# Patient Record
Sex: MALE | URBAN - NONMETROPOLITAN AREA
[De-identification: names, ages, dates, MRNs, and addresses within clinical notes are randomized per-mention and may not be internally consistent; named-entity substitution may affect disease eponyms.]

---

## 2023-05-11 ENCOUNTER — APPOINTMENT (RX ONLY)
Dept: URBAN - NONMETROPOLITAN AREA CLINIC 15 | Facility: CLINIC | Age: 53
Setting detail: DERMATOLOGY
End: 2023-05-11

## 2023-05-11 DIAGNOSIS — L60.3 NAIL DYSTROPHY: ICD-10-CM

## 2023-05-11 DIAGNOSIS — R20.8 OTHER DISTURBANCES OF SKIN SENSATION: ICD-10-CM

## 2023-05-11 PROCEDURE — 99203 OFFICE O/P NEW LOW 30 MIN: CPT

## 2023-05-11 PROCEDURE — ? FULL BODY SKIN EXAM - DECLINED

## 2023-05-11 PROCEDURE — ? OTHER

## 2023-05-11 PROCEDURE — ? COUNSELING

## 2023-05-11 PROCEDURE — ? PRESCRIPTION

## 2023-05-11 PROCEDURE — ? ADDITIONAL NOTES

## 2023-05-11 PROCEDURE — ? TREATMENT REGIMEN

## 2023-05-11 RX ORDER — UREA 10 %
LOTION (ML) TOPICAL
Qty: 85 | Refills: 6 | Status: ERX | COMMUNITY
Start: 2023-05-11

## 2023-05-11 RX ADMIN — Medication: at 00:00

## 2023-05-11 ASSESSMENT — LOCATION SIMPLE DESCRIPTION DERM
LOCATION SIMPLE: RIGHT HAND
LOCATION SIMPLE: LEFT THUMB

## 2023-05-11 ASSESSMENT — LOCATION DETAILED DESCRIPTION DERM
LOCATION DETAILED: RIGHT THUMBNAIL
LOCATION DETAILED: LEFT THUMBNAIL

## 2023-05-11 ASSESSMENT — LOCATION ZONE DERM: LOCATION ZONE: FINGERNAIL

## 2023-05-11 NOTE — PROCEDURE: OTHER
Note Text (......Xxx Chief Complaint.): This diagnosis correlates with the
Render Risk Assessment In Note?: no
Detail Level: Zone
Other (Free Text): Photos documented today

## 2023-05-11 NOTE — PROCEDURE: TREATMENT REGIMEN
Initiate Treatment: urea 10 % topical cream \\nQuantity: 85.0 g  Days Supply: 30\\nSig: Apply qd to soften nail
Plan: Patient's wife states that patient wants nail removed due to it being painful and catching on everything. Patient currently keeps nail cut down short. Patient is in stage 4 colon cancer and is immunocompromised, therefore, we warned against undergoing the procedure to have nail removed as he could end up with an infection.
Detail Level: Simple
Otc Regimen: Recommended [employer] rough and bumpy and to file nail down so its not so snaggy snd keep trimmed short.
Plan: Patient had pain around cuticle which now seems to be resolved.  No sign of infection today.

## 2023-06-12 ENCOUNTER — RX ONLY (OUTPATIENT)
Age: 53
Setting detail: RX ONLY
End: 2023-06-12

## 2023-06-12 RX ORDER — UREA 10 %
LOTION (ML) TOPICAL
Qty: 85 | Refills: 6 | Status: ERX